# Patient Record
Sex: FEMALE | Race: BLACK OR AFRICAN AMERICAN
[De-identification: names, ages, dates, MRNs, and addresses within clinical notes are randomized per-mention and may not be internally consistent; named-entity substitution may affect disease eponyms.]

---

## 2021-01-05 ENCOUNTER — HOSPITAL ENCOUNTER (EMERGENCY)
Dept: HOSPITAL 35 - ER | Age: 36
LOS: 1 days | Discharge: HOME | End: 2021-01-06
Payer: COMMERCIAL

## 2021-01-05 VITALS — WEIGHT: 293 LBS | BODY MASS INDEX: 41.95 KG/M2 | HEIGHT: 70 IN

## 2021-01-05 DIAGNOSIS — Z88.0: ICD-10-CM

## 2021-01-05 DIAGNOSIS — Z86.73: ICD-10-CM

## 2021-01-05 DIAGNOSIS — O26.893: Primary | ICD-10-CM

## 2021-01-05 DIAGNOSIS — O99.613: ICD-10-CM

## 2021-01-05 DIAGNOSIS — J45.909: ICD-10-CM

## 2021-01-05 DIAGNOSIS — Z3A.32: ICD-10-CM

## 2021-01-05 DIAGNOSIS — R06.02: ICD-10-CM

## 2021-01-05 DIAGNOSIS — O99.513: ICD-10-CM

## 2021-01-05 DIAGNOSIS — Z79.899: ICD-10-CM

## 2021-01-05 DIAGNOSIS — K21.9: ICD-10-CM

## 2021-01-05 DIAGNOSIS — R07.89: ICD-10-CM

## 2021-01-06 VITALS — DIASTOLIC BLOOD PRESSURE: 54 MMHG | SYSTOLIC BLOOD PRESSURE: 113 MMHG

## 2021-01-06 LAB
ALBUMIN SERPL-MCNC: 2.4 G/DL (ref 3.4–5)
ALT SERPL-CCNC: 16 U/L (ref 30–65)
ANION GAP SERPL CALC-SCNC: 11 MMOL/L (ref 7–16)
AST SERPL-CCNC: 12 U/L (ref 15–37)
BASOPHILS NFR BLD AUTO: 0.7 % (ref 0–2)
BILIRUB DIRECT SERPL-MCNC: < 0.1 MG/DL
BILIRUB SERPL-MCNC: 0.2 MG/DL (ref 0.2–1)
BUN SERPL-MCNC: 7 MG/DL (ref 7–18)
CALCIUM SERPL-MCNC: 9.6 MG/DL (ref 8.5–10.1)
CHLORIDE SERPL-SCNC: 105 MMOL/L (ref 98–107)
CO2 SERPL-SCNC: 22 MMOL/L (ref 21–32)
CREAT SERPL-MCNC: 0.6 MG/DL (ref 0.6–1)
EOSINOPHIL NFR BLD: 0.2 % (ref 0–3)
ERYTHROCYTE [DISTWIDTH] IN BLOOD BY AUTOMATED COUNT: 15.5 % (ref 10.5–14.5)
GLUCOSE SERPL-MCNC: 98 MG/DL (ref 74–106)
GRANULOCYTES NFR BLD MANUAL: 71.7 % (ref 36–66)
HCT VFR BLD CALC: 31.2 % (ref 37–47)
HGB BLD-MCNC: 10.2 GM/DL (ref 12–15)
LYMPHOCYTES NFR BLD AUTO: 21.1 % (ref 24–44)
MCH RBC QN AUTO: 23.8 PG (ref 26–34)
MCHC RBC AUTO-ENTMCNC: 32.7 G/DL (ref 28–37)
MCV RBC: 72.9 FL (ref 80–100)
MONOCYTES NFR BLD: 6.3 % (ref 1–8)
NEUTROPHILS # BLD: 7.5 THOU/UL (ref 1.4–8.2)
PLATELET # BLD: 270 THOU/UL (ref 150–400)
POTASSIUM SERPL-SCNC: 4.4 MMOL/L (ref 3.5–5.1)
PROT SERPL-MCNC: 6.6 G/DL (ref 6.4–8.2)
RBC # BLD AUTO: 4.28 MIL/UL (ref 4.2–5)
SODIUM SERPL-SCNC: 138 MMOL/L (ref 136–145)
TROPONIN I SERPL-MCNC: <0.06 NG/ML (ref ?–0.06)
WBC # BLD AUTO: 10.5 THOU/UL (ref 4–11)

## 2021-01-06 NOTE — EKG
[place] [address] [address] [address]
[place]  [address]
Phone:  [phone]                    ELECTROCARDIOGRAM REPORT      
_______________________________________________________________________________
 
Name:       NIKO PANDEY              Room #:                     DEP   
MALAIKA#:      [id number]     Account #:      [id number]  
Admission:  21    Attend Phys:                          
Discharge:  21    Date of Birth:  [birth year]  
                                                          Report #: [id number]
   [id number]
_______________________________________________________________________________
                         [facility] ED
                                       
Test Date:    2021               Test Time:    00:50:23
Pat Name:     NIKO PANDEY         Department:   
Patient ID:   [id number]            Room:          
Gender:       F                        Technician:   cara
:          1985               Requested By: Sarah Giron
Order Number: [id number] MD:   Sergio Perez
                                 Measurements
Intervals                              Axis          
Rate:         74                       P:            -57
NV:           196                      QRS:          18
QRSD:         80                       T:            12
QT:           373                                    
QTc:          414                                    
                           Interpretive Statements
NSR
Baseline wander in lead(s) II,III,aVR,aVF
No previous ECG available for comparison
Electronically Signed On 2021 11:25:46 CST by Sergio Perez
[email or web address]
 
 
 
 
 
 
 
 
 
 
 
 
 
 
 
 
 
 
 
 
 
 
  <ELECTRONICALLY SIGNED>
   By: Sergio Perez MD, [facility]    
  21     1125
D: 21 0050                           _____________________________________
T: 21 0050                           Sergio Perez MD, FACC      /EPI

## 2021-02-23 ENCOUNTER — HOSPITAL ENCOUNTER (EMERGENCY)
Dept: HOSPITAL 35 - ER | Age: 36
Discharge: STILL ADMITTED OR ADMINISTRATIVE | End: 2021-02-23
Payer: COMMERCIAL

## 2021-02-23 VITALS — SYSTOLIC BLOOD PRESSURE: 143 MMHG | DIASTOLIC BLOOD PRESSURE: 84 MMHG

## 2021-02-23 VITALS — WEIGHT: 293 LBS | HEIGHT: 70 IN | BODY MASS INDEX: 41.95 KG/M2

## 2021-02-23 DIAGNOSIS — J45.909: ICD-10-CM

## 2021-02-23 DIAGNOSIS — Z88.0: ICD-10-CM

## 2021-02-23 DIAGNOSIS — R07.89: Primary | ICD-10-CM

## 2021-02-23 DIAGNOSIS — Z20.822: ICD-10-CM

## 2021-02-23 DIAGNOSIS — J18.9: ICD-10-CM

## 2021-02-23 DIAGNOSIS — Z79.899: ICD-10-CM

## 2021-02-23 LAB
ALBUMIN SERPL-MCNC: 2.8 G/DL (ref 3.4–5)
ALT SERPL-CCNC: 28 U/L (ref 14–59)
ANION GAP SERPL CALC-SCNC: 12 MMOL/L (ref 7–16)
AST SERPL-CCNC: 24 U/L (ref 15–37)
BASOPHILS NFR BLD AUTO: 0.6 % (ref 0–2)
BILIRUB SERPL-MCNC: 0.4 MG/DL (ref 0.2–1)
BUN SERPL-MCNC: 9 MG/DL (ref 7–18)
CALCIUM SERPL-MCNC: 8.7 MG/DL (ref 8.5–10.1)
CHLORIDE SERPL-SCNC: 104 MMOL/L (ref 98–107)
CO2 SERPL-SCNC: 24 MMOL/L (ref 21–32)
CREAT SERPL-MCNC: 0.7 MG/DL (ref 0.6–1)
EOSINOPHIL NFR BLD: 0.2 % (ref 0–3)
ERYTHROCYTE [DISTWIDTH] IN BLOOD BY AUTOMATED COUNT: 20 % (ref 10.5–14.5)
GLUCOSE SERPL-MCNC: 108 MG/DL (ref 74–106)
GRANULOCYTES NFR BLD MANUAL: 76.4 % (ref 36–66)
HCT VFR BLD CALC: 28.9 % (ref 37–47)
HGB BLD-MCNC: 9.2 GM/DL (ref 12–15)
LYMPHOCYTES NFR BLD AUTO: 17.2 % (ref 24–44)
MCH RBC QN AUTO: 24.8 PG (ref 26–34)
MCHC RBC AUTO-ENTMCNC: 32 G/DL (ref 28–37)
MCV RBC: 77.6 FL (ref 80–100)
MONOCYTES NFR BLD: 5.6 % (ref 1–8)
NEUTROPHILS # BLD: 8 THOU/UL (ref 1.4–8.2)
PLATELET # BLD: 345 THOU/UL (ref 150–400)
POTASSIUM SERPL-SCNC: 4.3 MMOL/L (ref 3.5–5.1)
PROT SERPL-MCNC: 6.8 G/DL (ref 6.4–8.2)
RBC # BLD AUTO: 3.73 MIL/UL (ref 4.2–5)
SODIUM SERPL-SCNC: 140 MMOL/L (ref 136–145)
TROPONIN I SERPL-MCNC: <0.06 NG/ML (ref ?–0.06)
WBC # BLD AUTO: 10.5 THOU/UL (ref 4–11)

## 2021-02-23 NOTE — EKG
[facility]
Who@
[place]  [address]
Phone:  [phone]                    ELECTROCARDIOGRAM REPORT      
_______________________________________________________________________________
 
Name:       NIKO PANDEY              Room #:                     DEP   
MALAIKA#:      [id number]     Account #:      [id number]  
Admission:  21    Attend Phys:                          
Discharge:  21    Date of Birth:  [birth year]  
                                                          Report #: [id number]
   [id number]
_______________________________________________________________________________
                         [facility] ED
                                       
Test Date:    2021               Test Time:    04:29:39
Pat Name:     NIKO PANDEY         Department:   
Patient ID:   [id number]            Room:          
Gender:       F                        Technician:   LYNNE
:          1985               Requested By: Shamar Verde
Order Number: [id number] MD:   Sergio Perez
                                 Measurements
Intervals                              Axis          
Rate:         82                       P:            1
AK:           182                      QRS:          -2
QRSD:         77                       T:            37
QT:           348                                    
QTc:          407                                    
                           Interpretive Statements
Sinus rhythm
LVH by voltage
Q lead III only
Compared to ECG 2021 00:50:23
Left ventricular hypertrophy now present
Electronically Signed On 2021 12:47:45 CST by Sergio Perez
[email or web address]
 
 
 
 
 
 
 
 
 
 
 
 
 
 
 
 
 
 
 
 
  <ELECTRONICALLY SIGNED>
   By: Sergio Perez MD, [facility]    
  21     1247
D: 21 0429                           _____________________________________
T: 21 0429                           Sergio Perez MD, FACC      /EPI

## 2021-03-04 ENCOUNTER — HOSPITAL ENCOUNTER (OUTPATIENT)
Dept: HOSPITAL 35 - ER | Age: 36
Setting detail: OBSERVATION
LOS: 3 days | Discharge: HOME | End: 2021-03-07
Attending: INTERNAL MEDICINE | Admitting: INTERNAL MEDICINE
Payer: COMMERCIAL

## 2021-03-04 VITALS — DIASTOLIC BLOOD PRESSURE: 99 MMHG | SYSTOLIC BLOOD PRESSURE: 155 MMHG

## 2021-03-04 VITALS — HEIGHT: 70 IN | WEIGHT: 293 LBS | BODY MASS INDEX: 41.95 KG/M2

## 2021-03-04 DIAGNOSIS — Z79.899: ICD-10-CM

## 2021-03-04 DIAGNOSIS — J45.909: ICD-10-CM

## 2021-03-04 DIAGNOSIS — J20.9: Primary | ICD-10-CM

## 2021-03-04 DIAGNOSIS — J18.9: ICD-10-CM

## 2021-03-04 DIAGNOSIS — Z20.822: ICD-10-CM

## 2021-03-04 DIAGNOSIS — Z88.0: ICD-10-CM

## 2021-03-04 DIAGNOSIS — E66.01: ICD-10-CM

## 2021-03-04 DIAGNOSIS — Z86.73: ICD-10-CM

## 2021-03-04 LAB
ALBUMIN SERPL-MCNC: 3.5 G/DL (ref 3.4–5)
ALT SERPL-CCNC: 40 U/L (ref 14–59)
ANION GAP SERPL CALC-SCNC: 11 MMOL/L (ref 7–16)
APTT BLD: 32.5 SECONDS (ref 24.5–32.8)
AST SERPL-CCNC: 20 U/L (ref 15–37)
BE(VIVO): 2 MMOL/L
BILIRUB SERPL-MCNC: 0.7 MG/DL (ref 0.2–1)
BUN SERPL-MCNC: 10 MG/DL (ref 7–18)
CALCIUM SERPL-MCNC: 8.8 MG/DL (ref 8.5–10.1)
CHLORIDE SERPL-SCNC: 101 MMOL/L (ref 98–107)
CO2 SERPL-SCNC: 26 MMOL/L (ref 21–32)
CREAT SERPL-MCNC: 0.7 MG/DL (ref 0.6–1)
ERYTHROCYTE [DISTWIDTH] IN BLOOD BY AUTOMATED COUNT: 18.4 % (ref 10.5–14.5)
GLUCOSE SERPL-MCNC: 100 MG/DL (ref 74–106)
HCO3 BLD-SCNC: 25.2 MMOL/L (ref 22–26)
HCT VFR BLD CALC: 30 % (ref 37–47)
HGB BLD-MCNC: 9.7 GM/DL (ref 12–15)
INR PPP: 1
MCH RBC QN AUTO: 24.1 PG (ref 26–34)
MCHC RBC AUTO-ENTMCNC: 32.5 G/DL (ref 28–37)
MCV RBC: 74.1 FL (ref 80–100)
PCO2 BLD: 34.4 MMHG (ref 35–45)
PLATELET # BLD: 432 THOU/UL (ref 150–400)
PO2 BLD: 61.5 MMHG (ref 80–100)
POTASSIUM SERPL-SCNC: 4.4 MMOL/L (ref 3.5–5.1)
PROT SERPL-MCNC: 7.1 G/DL (ref 6.4–8.2)
PROTHROMBIN TIME: 11 SECONDS (ref 9.3–11.4)
RBC # BLD AUTO: 4.05 MIL/UL (ref 4.2–5)
SODIUM SERPL-SCNC: 138 MMOL/L (ref 136–145)
TROPONIN I SERPL-MCNC: <0.06 NG/ML (ref ?–0.06)
URIC ACID*: 4.1 MG/DL (ref 2.6–6)
WBC # BLD AUTO: 10.9 THOU/UL (ref 4–11)

## 2021-03-04 PROCEDURE — 10195: CPT

## 2021-03-05 VITALS — DIASTOLIC BLOOD PRESSURE: 77 MMHG | SYSTOLIC BLOOD PRESSURE: 118 MMHG

## 2021-03-05 VITALS — DIASTOLIC BLOOD PRESSURE: 82 MMHG | SYSTOLIC BLOOD PRESSURE: 128 MMHG

## 2021-03-05 VITALS — SYSTOLIC BLOOD PRESSURE: 119 MMHG | DIASTOLIC BLOOD PRESSURE: 74 MMHG

## 2021-03-05 VITALS — DIASTOLIC BLOOD PRESSURE: 79 MMHG | SYSTOLIC BLOOD PRESSURE: 127 MMHG

## 2021-03-05 VITALS — SYSTOLIC BLOOD PRESSURE: 119 MMHG | DIASTOLIC BLOOD PRESSURE: 71 MMHG

## 2021-03-05 VITALS — DIASTOLIC BLOOD PRESSURE: 77 MMHG | SYSTOLIC BLOOD PRESSURE: 125 MMHG

## 2021-03-05 LAB
ANION GAP SERPL CALC-SCNC: 8 MMOL/L (ref 7–16)
BACTERIA-REFLEX: (no result) /HPF
BILIRUB UR-MCNC: NEGATIVE MG/DL
BUN SERPL-MCNC: 11 MG/DL (ref 7–18)
CALCIUM SERPL-MCNC: 8.8 MG/DL (ref 8.5–10.1)
CHLORIDE SERPL-SCNC: 103 MMOL/L (ref 98–107)
CO2 SERPL-SCNC: 26 MMOL/L (ref 21–32)
COLOR UR: YELLOW
CREAT SERPL-MCNC: 0.8 MG/DL (ref 0.6–1)
ERYTHROCYTE [DISTWIDTH] IN BLOOD BY AUTOMATED COUNT: 18.9 % (ref 10.5–14.5)
GLUCOSE SERPL-MCNC: 108 MG/DL (ref 74–106)
HCT VFR BLD CALC: 27.1 % (ref 37–47)
HGB BLD-MCNC: 8.6 GM/DL (ref 12–15)
KETONES UR STRIP-MCNC: NEGATIVE MG/DL
MCH RBC QN AUTO: 23.7 PG (ref 26–34)
MCHC RBC AUTO-ENTMCNC: 31.8 G/DL (ref 28–37)
MCV RBC: 74.4 FL (ref 80–100)
MUCUS: (no result) STRN/LPF
PLATELET # BLD: 393 THOU/UL (ref 150–400)
POTASSIUM SERPL-SCNC: 3.9 MMOL/L (ref 3.5–5.1)
RBC # BLD AUTO: 3.64 MIL/UL (ref 4.2–5)
RBC # UR STRIP: (no result) /UL
RBC #/AREA URNS HPF: (no result) /HPF (ref 0–2)
SODIUM SERPL-SCNC: 137 MMOL/L (ref 136–145)
SP GR UR STRIP: 1.01 (ref 1–1.03)
SQUAMOUS: (no result) /LPF (ref 0–3)
URINE CLARITY: CLEAR
URINE GLUCOSE-RANDOM*: NEGATIVE
URINE LEUKOCYTES-REFLEX: (no result)
URINE NITRITE-REFLEX: NEGATIVE
URINE PROTEIN (DIPSTICK): NEGATIVE
URINE WBC-REFLEX: (no result) /HPF (ref 0–5)
UROBILINOGEN UR STRIP-ACNC: 1 E.U./DL (ref 0.2–1)
WBC # BLD AUTO: 10.7 THOU/UL (ref 4–11)

## 2021-03-05 NOTE — NUR
Received awake on bed. Due medications given as prescribed, able to swallow
meds w/o difficulty. On O2 at 2lpm via nasal cannula. On MS, not on telemetry;
no complains and signs of chest pain, crushing sensation and heaviness.
Assisted in ADLs. On regular diet- tolerating well; no nausea, no vomiting
and no abdominal pain noted. Continent of b

## 2021-03-05 NOTE — NUR
ASSESSMENT: CM REVIEWED CHART AND MET WITH PATIENT. PT IS ALERT AND ORIENTED
X4. PT WAS ADMITTED WITH DYSPNEA/PNEUMONIA. PER CHART PT HAD C SECTION THREE
WEEKS AGO. PT REPORTS THAT SHE LIVES IN A TOWNHOME WITH HER 7 CHILDREN. PT
REPORTS THAT HER MOTHER IS CURRENTLY AT HER HOME TAKING CARE OF THEM. PT
REPORTS THAT SHE IS NORMALLY INDEPENDENT WITH ADLS AND AMBULATION. PT REPORTS
HAVING TWO STEPS TO ENTER THE TOWNHOME AND ABOUT 16 STEPS WITH HANDRAIL TO THE
UPPER LEVEL. PT REPORTS SHE DOES NOT WEAR OXYGEN AT HOME AND IS CURRENTLY ON
2L. PULM HAS BEEN CONSULTED.  PTS COVID TEST WAS
NEGATIVE. PT REPORTS HER PCP IS AT [facility] AROUND [address] AND [address].
CM WILL CONTINUE TO FOLLOW TO ASSIST AS NEEDED. PT IS HOPEFUL SHE WILL HAVE NO
NEEDS FROM CM PRIOR TO DISCHARGE.

## 2021-03-05 NOTE — NUR
Received awake on bed. On O2 at 2lpm via nasal cannula. On MS, not on
telemetry; no complains and signs of chest pain, crushing sensation and
heaviness. Assisted in ADLs. Vital signs stable. On regular diet- tolerating
well; no nausea, no vomiting and no abdominal pain noted. Continent of bowel
and bladder, able to use bedside commode. Falls bundle in place. With NS at
125cc/hr, infusing well at L AC- saline locked as ordered. With edema noted at
LE; kept elevated.
Complained of pain, due PRN pain meds given as prescribed.
Pt seen and examined by Dr Arroyo this AM- one time furosemide given as
prescribed.
To continue monitoring patient.

## 2021-03-05 NOTE — NUR
RECEIVED CARE OF THIS PATIENT AT 0500.  PATIENT ARRIVED FROM ED VIA CART
ACCOMPANIED BY ED PERSONEL.  PATIENT ALERT AND ORIENTED X4.  LUNGS DIMINISHED.
 HAS EDEMA JOSIAS LOWER EXT.  UP WITH ASSIST.  O2 2L/NC.  C/O PAIN.  MED GIVEN.

## 2021-03-05 NOTE — EKG
[address] [employer]
[place]  [address]
Phone:  [phone]                    ELECTROCARDIOGRAM REPORT      
_______________________________________________________________________________
 
Name:       NIKO PANDEY              Room #:         441-P       ADM IN  
M.R.#:      [id number]     Account #:      [id number]  
Admission:  21    Attend Phys:    Efra Arroyo
Discharge:              Date of Birth:  [birth year]  
                                                          Report #: [id number]
   [id number]
_______________________________________________________________________________
                         [facility] ED
                                       
Test Date:    2021               Test Time:    16:38:37
Pat Name:     NIKO PANDEY         Department:   
Patient ID:   [id number]            Room:         441
Gender:       F                        Technician:   JCSUYAPA
:          1985               Requested By: Patti Bernal
Order Number: [id number] MD:   Sergio Perez
                                 Measurements
Intervals                              Axis          
Rate:         100                      P:            22
SC:           175                      QRS:          -5
QRSD:         76                       T:            46
QT:           341                                    
QTc:          440                                    
                           Interpretive Statements
Sinus tachycardia
Probable left atrial enlargement
Probable left ventricular hypertrophy
Compared to ECG 2021 04:29:39
Sinus rhythm no longer present
Electronically Signed On 3-5-2021 7:11:53 CST by Sergio Perez
[email or web address]
 
 
 
 
 
 
 
 
 
 
 
 
 
 
 
 
 
 
 
 
  <ELECTRONICALLY SIGNED>
   By: Sergio Perez MD, [facility]    
  21     0711
D: 03/1638                           _____________________________________
T: 21                           Sergio Perez MD, FACC      /EPI

## 2021-03-06 VITALS — SYSTOLIC BLOOD PRESSURE: 144 MMHG | DIASTOLIC BLOOD PRESSURE: 92 MMHG

## 2021-03-06 VITALS — SYSTOLIC BLOOD PRESSURE: 126 MMHG | DIASTOLIC BLOOD PRESSURE: 80 MMHG

## 2021-03-06 VITALS — DIASTOLIC BLOOD PRESSURE: 92 MMHG | SYSTOLIC BLOOD PRESSURE: 144 MMHG

## 2021-03-06 LAB
BASOPHILS NFR BLD AUTO: 0.3 % (ref 0–2)
EOSINOPHIL NFR BLD: 0.2 % (ref 0–3)
ERYTHROCYTE [DISTWIDTH] IN BLOOD BY AUTOMATED COUNT: 18.5 % (ref 10.5–14.5)
GRANULOCYTES NFR BLD MANUAL: 74.7 % (ref 36–66)
HCT VFR BLD CALC: 26 % (ref 37–47)
HGB BLD-MCNC: 8.3 GM/DL (ref 12–15)
LYMPHOCYTES NFR BLD AUTO: 18.2 % (ref 24–44)
MCH RBC QN AUTO: 23.6 PG (ref 26–34)
MCHC RBC AUTO-ENTMCNC: 31.8 G/DL (ref 28–37)
MCV RBC: 74.3 FL (ref 80–100)
MONOCYTES NFR BLD: 6.6 % (ref 1–8)
NEUTROPHILS # BLD: 7 THOU/UL (ref 1.4–8.2)
PLATELET # BLD: 380 THOU/UL (ref 150–400)
RBC # BLD AUTO: 3.5 MIL/UL (ref 4.2–5)
WBC # BLD AUTO: 9.4 THOU/UL (ref 4–11)

## 2021-03-06 NOTE — NUR
Assumedpt care this am, Vs stable thorugh pt did verbalize that she would have
chest pain on both sides of her chest intermittently that would radiate to her
shoulders, and this would improve with O2. Pt requested for a cardio consult
be made since her father had  of a your age d/t chf and a heart attack. MD
informed, EKG done sinus rhythm was noted. Seen by Dr. Beck, new medications
given, pain is managed with medications. Started on blood sugar checks d/t
solumedrol, explained to the pt. POC followed with no signs or verbalizations
of distress noted.

## 2021-03-07 VITALS — DIASTOLIC BLOOD PRESSURE: 71 MMHG | SYSTOLIC BLOOD PRESSURE: 111 MMHG

## 2021-03-07 VITALS — DIASTOLIC BLOOD PRESSURE: 84 MMHG | SYSTOLIC BLOOD PRESSURE: 141 MMHG

## 2021-03-07 NOTE — NUR
Assumed pt care this am, vs stable. Pt pain is very minimal not requiring
medications. Steady on her gait and is able to ambulate w/o SOB. POC followed,
with no signs or verbalizations of distress noted. IV removed, dc instructions
given to the pt. Awaiting for mother of the pt to pick pt up at th ER.

## 2021-03-07 NOTE — NUR
PT WAS OBSERVED TALKING ON THE PHONE WITH HER KIDS AT START OF SHIFT.PT C/O
PAIN,MANAGED WITH MED.PT CONT ON STEROIDS.PT REQUESTED FOR STOOL SOFTNER,DR DELA CRUZ NOTIFIED.ORDER NOTED AND CARRIED OUT.PT CONT ON 2L/NC.SOB WITH
ACTIVITY.PT STATED THAT SHE IS FEELING MUCH BETTER.PT SLEEPING AT THIS TIME.

## 2021-03-08 NOTE — EKG
[address]  [address]
Phone:  [phone]                    ELECTROCARDIOGRAM REPORT      
_______________________________________________________________________________
 
Name:       NIKO PANDEY              Room #:         441-[facility] [place]  
M.R.#:      [id number]     Account #:      [id number]  
Admission:  21    Attend Phys:    Efra Arroyo
Discharge:  21    Date of Birth:  [birth year]  
                                                          Report #: [id number]
   [id number]
_______________________________________________________________________________
                          [facility]
                                       
Test Date:    2021               Test Time:    09:22:00
Pat Name:     NIKO PANDEY         Department:   
Patient ID:   [id number]            Room:         [facility]
Gender:       F                        Technician:   SIXTO
:          1985               Requested By: Efra Arroyo
Order Number: [id number] MD:   Sergio Perez
                                 Measurements
Intervals                              Axis          
Rate:         88                       P:            44
AZ:           180                      QRS:          7
QRSD:         81                       T:            53
QT:           362                                    
QTc:          438                                    
                           Interpretive Statements
Sinus rhythm
Compared to ECG 2021 16:38:37
Sinus tachycardia no longer present
Electronically Signed On 3-8-2021 7:36:34 CST by Sergio Perez
[email or web address]
 
 
 
 
 
 
 
 
 
 
 
 
 
 
 
 
 
 
 
 
 
 
  <ELECTRONICALLY SIGNED>
   By: Sergio Perez MD, [facility]    
  21
D: 21                           _____________________________________
T: 21                           Sergio Perez MD, FACC      /EPI